# Patient Record
Sex: FEMALE | URBAN - METROPOLITAN AREA
[De-identification: names, ages, dates, MRNs, and addresses within clinical notes are randomized per-mention and may not be internally consistent; named-entity substitution may affect disease eponyms.]

---

## 2017-05-12 ENCOUNTER — OFFICE VISIT (OUTPATIENT)
Dept: PEDIATRICS | Facility: CLINIC | Age: 27
End: 2017-05-12
Attending: GENETIC COUNSELOR, MS
Payer: MEDICARE

## 2017-05-12 DIAGNOSIS — E03.8 OTHER SPECIFIED HYPOTHYROIDISM: ICD-10-CM

## 2017-05-12 DIAGNOSIS — G93.89: ICD-10-CM

## 2017-05-12 DIAGNOSIS — E11.9 TYPE 2 DIABETES MELLITUS (H): ICD-10-CM

## 2017-05-12 DIAGNOSIS — E11.8 TYPE 2 DIABETES MELLITUS WITH COMPLICATION, UNSPECIFIED LONG TERM INSULIN USE STATUS: ICD-10-CM

## 2017-05-12 DIAGNOSIS — Z82.79 FAMILY HISTORY OF CONGENITAL OR GENETIC CONDITION: Primary | ICD-10-CM

## 2017-05-12 DIAGNOSIS — E03.9 HYPOTHYROIDISM: ICD-10-CM

## 2017-05-12 PROCEDURE — 36415 COLL VENOUS BLD VENIPUNCTURE: CPT | Performed by: PEDIATRICS

## 2017-05-12 PROCEDURE — 40000072 ZZH STATISTIC GENETIC COUNSELING, < 16 MIN: Mod: ZF | Performed by: GENETIC COUNSELOR, MS

## 2017-05-12 PROCEDURE — 40000803 ZZHCL STATISTIC DNA ISOL HIGH PURITY: Performed by: PEDIATRICS

## 2017-05-12 NOTE — LETTER
2017      RE: Karen Farley  [address] [facility] [address]       Presenting Information:  Karen Farley is a 27-year-old female whose son Nena was recently found to have a mutation in the GNAS gene: c.3075_3077delCAT (p.[id number]).  I met with Karen to discuss this result and the option of testing herself for this mutation.  Consent was obtained and blood drawn but due to Karen's insurance plan, will remain on hold for the time being.      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  She reports having memory problems.  Karen is adopted and she has limited information about her biological family.  Her mother is , reportedly due to an automobile accident.  Nena is Karen's only child.      Discussion:  Karen's son Nena is currently admitted due to respiratory acidosis.  He has findings of central apnea and periodic breathing pattern, laryngomalacia, elevated PTH level, hyperkalemia, and rib fractures.  At the request of Dr. Carmen Torres genetic testing was sent for genes related to surfactant metabolism dysfunction, central hypoventilation, and the GNAS gene.  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  The notation of this mutation refers to its location and its effect. This mutation has been reported previously in other patients.    We discussed that mutations in the GNAS gene can cause pseudohypoparathyroidism type 1a and Pascale hereditary osteodystrophy (also known as pseudopseudohypoparathyroidism).   The phenotype observed is determined by which allele (maternal or paternal) the mutation is found on due to imprinting.  When a GNAS mutation is found on the maternal allele it causes pseudohypoparathyroidism type 1a which is characterized by short stature, round face, skeletal changes, increased risk for intellectual disability, and multiple hormone  resistance.  When the GNAS mutation is found on the paternal allele it causes many of the same symptoms but is not associated with hormone resistance.       Given Nena's features as well as Karen's personal history of several features that may be explained by pseudohypoparathyroidism type 1a, I do suspect Nena's GNAS mutation was inherited from Karen.  Determining this is important for both Nena and Karen's health and healthcare, as it would inform proper surveillance, management, and treatment.      We reviewed the costs, limitations, and benefits of testing and Karen provided written informed consent for this. We also discussed insurance coverage for testing.  Karen currently has coverage through Medicare only.  We will submit a prior authorization for this testing but because of Medicare's very limited coverage of genetic tests, this may or may not be covered.  Karen indicated she is starting a new job soon and likely would get a new insurance plan.  I encouraged her to contact me if and when this happens.  Testing will remain on hold until approval is obtained.      It was a pleasure meeting with Karen and her family.  My contact information was shared should she have additional questions.      Plan:   1.  On the family's behalf we will submit a prior authorization for genetic testing  2.  If approved, GNAS single-site analysis.  If denied, Karen was instructed to contact us if and when her insurance plan changes  3.  Karen should follow-up with her healthcare providers regarding management of this possible diagnosis       Erika Amaya [facility]  Genetic Counselor  Division of Genetics and Metabolism    Total time spent in consultation with the family was approximately 10 minutes        Erika Amaya GC

## 2017-05-12 NOTE — MR AVS SNAPSHOT
After Visit Summary   2017    Karen Farley    MRN: [id number]           Patient Information     Date Of Birth          1990        Visit Information        Provider Department      2017 2:00 PM Erika Amaya GC Peds Metabolism        Today's Diagnoses     Family history of congenital or genetic condition    -  1    Hypothyroidism        Type 2 diabetes mellitus (H)        Encounter for genetic counseling        Calcium deposits of brain        Other specified hypothyroidism        Type 2 diabetes mellitus with complication, unspecified long term insulin use status (H)           Follow-ups after your visit        Who to contact     Please call your clinic at [phone] to:    Ask questions about your health    Make or cancel appointments    Discuss your medicines    Learn about your test results    Speak to your doctor   If you have compliments or concerns about an experience at your clinic, or if you wish to file a complaint, please contact [facility] Patient Relations at [phone] or email us at [email or web address]         Additional Information About Your Visit        MyChart Information     [employer] is an electronic gateway that provides easy, online access to your medical records. With [employer], you can request a clinic appointment, read your test results, renew a prescription or communicate with your care team.     To sign up for [employer] visit the website at [email or web address]   You will be asked to enter the access code listed below, as well as some personal information. Please follow the directions to create your username and password.     Your access code is: [email or web address]  Expires: 2017  2:11 PM     Your access code will  in 90 days. If you need help or a new code, please contact your [facility] or call [phone] for assistance.        Care EveryWhere ID     This is your Care  EveryWhere ID. This could be used by other organizations to access your [facility] medical records  [id number]         Blood Pressure from Last 3 Encounters:   No data found for BP    Weight from Last 3 Encounters:   No data found for Wt              We Performed the Following     Next Generation Sequencing        Primary Care Provider Office Phone # Fax #    Jasper Roberson [phone]       [place] [address]        Thank you!     Thank you for choosing [facility]  for your care. Our goal is always to provide you with excellent care. Hearing back from our patients is one way we can continue to improve our services. Please take a few minutes to complete the written survey that you may receive in the mail after your visit with us. Thank you!             Your Updated Medication List - Protect others around you: Learn how to safely use, store and throw away your medicines at www.disposemymeds.org.      Notice  As of 5/12/2017 11:59 PM    You have not been prescribed any medications.

## 2017-05-12 NOTE — PROGRESS NOTES
Presenting Information:  Karen Farley is a 27-year-old female whose son Nena was recently found to have a mutation in the GNAS gene: c.3075_3077delCAT (p.[id number]).  I met with Karen to discuss this result and the option of testing herself for this mutation.  Consent was obtained and blood drawn but due to Karen's insurance plan, will remain on hold for the time being.      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  She reports having memory problems.  Karen is adopted and she has limited information about her biological family.  Her mother is , reportedly due to an automobile accident.  Nena is Karen's only child.      Discussion:  Karen's son Nena is currently admitted due to respiratory acidosis.  He has findings of central apnea and periodic breathing pattern, laryngomalacia, elevated PTH level, hyperkalemia, and rib fractures.  At the request of Dr. Carmen Torres genetic testing was sent for genes related to surfactant metabolism dysfunction, central hypoventilation, and the GNAS gene.  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  The notation of this mutation refers to its location and its effect. This mutation has been reported previously in other patients.    We discussed that mutations in the GNAS gene can cause pseudohypoparathyroidism type 1a and Pascale hereditary osteodystrophy (also known as pseudopseudohypoparathyroidism).   The phenotype observed is determined by which allele (maternal or paternal) the mutation is found on due to imprinting.  When a GNAS mutation is found on the maternal allele it causes pseudohypoparathyroidism type 1a which is characterized by short stature, round face, skeletal changes, increased risk for intellectual disability, and multiple hormone resistance.  When the GNAS mutation is found on the paternal allele it causes many  of the same symptoms but is not associated with hormone resistance.       Given Nena's features as well as Karen's personal history of several features that may be explained by pseudohypoparathyroidism type 1a, I do suspect Nena's GNAS mutation was inherited from Karen.  Determining this is important for both Nena and Karen's health and healthcare, as it would inform proper surveillance, management, and treatment.      We reviewed the costs, limitations, and benefits of testing and Karen provided written informed consent for this. We also discussed insurance coverage for testing.  Karen currently has coverage through Medicare only.  We will submit a prior authorization for this testing but because of Medicare's very limited coverage of genetic tests, this may or may not be covered.  Karen indicated she is starting a new job soon and likely would get a new insurance plan.  I encouraged her to contact me if and when this happens.  Testing will remain on hold until approval is obtained.      It was a pleasure meeting with Karen and her family.  My contact information was shared should she have additional questions.      Plan:   1.  On the family's behalf we will submit a prior authorization for genetic testing  2.  If approved, GNAS single-site analysis.  If denied, Karen was instructed to contact us if and when her insurance plan changes  3.  Karen should follow-up with her healthcare providers regarding management of this possible diagnosis       Erika Amaya MS [facility]  Genetic Counselor  Division of Genetics and Metabolism    Total time spent in consultation with the family was approximately 10 minutes

## 2017-05-22 LAB — COPATH REPORT: NORMAL

## 2018-01-05 LAB — COPATH REPORT: NORMAL
